# Patient Record
Sex: MALE | Race: WHITE | Employment: FULL TIME | URBAN - METROPOLITAN AREA
[De-identification: names, ages, dates, MRNs, and addresses within clinical notes are randomized per-mention and may not be internally consistent; named-entity substitution may affect disease eponyms.]

---

## 2018-11-05 ENCOUNTER — HOSPITAL ENCOUNTER (OUTPATIENT)
Dept: ULTRASOUND IMAGING | Age: 53
Discharge: HOME | End: 2018-11-07
Payer: COMMERCIAL

## 2018-11-05 DIAGNOSIS — Z82.49 FAMILY HISTORY OF ISCHEMIC HEART DISEASE: ICD-10-CM

## 2018-11-05 PROCEDURE — 93978 VASCULAR STUDY: CPT

## 2019-01-11 ENCOUNTER — ANESTHESIA EVENT (OUTPATIENT)
Dept: ENDOSCOPY | Age: 54
End: 2019-01-11
Payer: COMMERCIAL

## 2019-01-14 ENCOUNTER — ANESTHESIA (OUTPATIENT)
Dept: ENDOSCOPY | Age: 54
End: 2019-01-14
Payer: COMMERCIAL

## 2019-01-14 ENCOUNTER — HOSPITAL ENCOUNTER (OUTPATIENT)
Age: 54
Setting detail: OUTPATIENT SURGERY
Discharge: HOME | End: 2019-01-14
Attending: SPECIALIST | Admitting: SPECIALIST
Payer: COMMERCIAL

## 2019-01-14 VITALS
SYSTOLIC BLOOD PRESSURE: 114 MMHG | HEIGHT: 72 IN | TEMPERATURE: 97.5 F | BODY MASS INDEX: 24.38 KG/M2 | OXYGEN SATURATION: 95 % | DIASTOLIC BLOOD PRESSURE: 73 MMHG | RESPIRATION RATE: 16 BRPM | WEIGHT: 180 LBS | HEART RATE: 65 BPM

## 2019-01-14 VITALS
RESPIRATION RATE: 14 BRPM | OXYGEN SATURATION: 97 % | DIASTOLIC BLOOD PRESSURE: 59 MMHG | SYSTOLIC BLOOD PRESSURE: 89 MMHG

## 2019-01-14 PROCEDURE — 3700000000 HC ANESTHESIA ATTENDED CARE: Performed by: SPECIALIST

## 2019-01-14 PROCEDURE — 7100000010 HC PHASE II RECOVERY - FIRST 15 MIN: Performed by: SPECIALIST

## 2019-01-14 PROCEDURE — 6360000002 HC RX W HCPCS: Performed by: NURSE ANESTHETIST, CERTIFIED REGISTERED

## 2019-01-14 PROCEDURE — 7100000011 HC PHASE II RECOVERY - ADDTL 15 MIN: Performed by: SPECIALIST

## 2019-01-14 PROCEDURE — 2500000003 HC RX 250 WO HCPCS: Performed by: NURSE ANESTHETIST, CERTIFIED REGISTERED

## 2019-01-14 PROCEDURE — 3609027000 HC COLONOSCOPY: Performed by: SPECIALIST

## 2019-01-14 PROCEDURE — 88305 TISSUE EXAM BY PATHOLOGIST: CPT

## 2019-01-14 PROCEDURE — 2580000003 HC RX 258: Performed by: SPECIALIST

## 2019-01-14 PROCEDURE — 3700000001 HC ADD 15 MINUTES (ANESTHESIA): Performed by: SPECIALIST

## 2019-01-14 RX ORDER — ONDANSETRON 2 MG/ML
4 INJECTION INTRAMUSCULAR; INTRAVENOUS
Status: DISCONTINUED | OUTPATIENT
Start: 2019-01-14 | End: 2019-01-14 | Stop reason: HOSPADM

## 2019-01-14 RX ORDER — LIDOCAINE HYDROCHLORIDE 10 MG/ML
1 INJECTION, SOLUTION EPIDURAL; INFILTRATION; INTRACAUDAL; PERINEURAL
Status: DISCONTINUED | OUTPATIENT
Start: 2019-01-14 | End: 2019-01-14 | Stop reason: HOSPADM

## 2019-01-14 RX ORDER — SODIUM CHLORIDE 0.9 % (FLUSH) 0.9 %
10 SYRINGE (ML) INJECTION PRN
Status: DISCONTINUED | OUTPATIENT
Start: 2019-01-14 | End: 2019-01-14 | Stop reason: HOSPADM

## 2019-01-14 RX ORDER — SODIUM CHLORIDE 9 MG/ML
INJECTION, SOLUTION INTRAVENOUS CONTINUOUS
Status: DISCONTINUED | OUTPATIENT
Start: 2019-01-14 | End: 2019-01-14 | Stop reason: HOSPADM

## 2019-01-14 RX ORDER — PROPOFOL 10 MG/ML
INJECTION, EMULSION INTRAVENOUS PRN
Status: DISCONTINUED | OUTPATIENT
Start: 2019-01-14 | End: 2019-01-14 | Stop reason: SDUPTHER

## 2019-01-14 RX ORDER — SODIUM CHLORIDE 0.9 % (FLUSH) 0.9 %
10 SYRINGE (ML) INJECTION EVERY 12 HOURS SCHEDULED
Status: DISCONTINUED | OUTPATIENT
Start: 2019-01-14 | End: 2019-01-14 | Stop reason: HOSPADM

## 2019-01-14 RX ORDER — GLYCOPYRROLATE 1 MG/5 ML
SYRINGE (ML) INTRAVENOUS PRN
Status: DISCONTINUED | OUTPATIENT
Start: 2019-01-14 | End: 2019-01-14 | Stop reason: SDUPTHER

## 2019-01-14 RX ADMIN — PROPOFOL 50 MG: 10 INJECTION, EMULSION INTRAVENOUS at 08:14

## 2019-01-14 RX ADMIN — SODIUM CHLORIDE: 9 INJECTION, SOLUTION INTRAVENOUS at 07:41

## 2019-01-14 RX ADMIN — PROPOFOL 40 MG: 10 INJECTION, EMULSION INTRAVENOUS at 08:04

## 2019-01-14 RX ADMIN — PROPOFOL 30 MG: 10 INJECTION, EMULSION INTRAVENOUS at 08:19

## 2019-01-14 RX ADMIN — PROPOFOL 30 MG: 10 INJECTION, EMULSION INTRAVENOUS at 08:08

## 2019-01-14 RX ADMIN — PROPOFOL 30 MG: 10 INJECTION, EMULSION INTRAVENOUS at 08:28

## 2019-01-14 RX ADMIN — PROPOFOL 80 MG: 10 INJECTION, EMULSION INTRAVENOUS at 08:03

## 2019-01-14 RX ADMIN — PROPOFOL 30 MG: 10 INJECTION, EMULSION INTRAVENOUS at 08:23

## 2019-01-14 RX ADMIN — PROPOFOL 20 MG: 10 INJECTION, EMULSION INTRAVENOUS at 08:10

## 2019-01-14 RX ADMIN — PROPOFOL 30 MG: 10 INJECTION, EMULSION INTRAVENOUS at 08:33

## 2019-01-14 RX ADMIN — PROPOFOL 30 MG: 10 INJECTION, EMULSION INTRAVENOUS at 08:05

## 2019-01-14 RX ADMIN — Medication 0.2 MG: at 08:19

## 2025-05-19 ENCOUNTER — OFFICE VISIT (OUTPATIENT)
Dept: ORTHOPEDIC SURGERY | Facility: CLINIC | Age: 60
End: 2025-05-19
Payer: COMMERCIAL

## 2025-05-19 ENCOUNTER — HOSPITAL ENCOUNTER (OUTPATIENT)
Dept: RADIOLOGY | Facility: CLINIC | Age: 60
Discharge: HOME | End: 2025-05-19
Payer: COMMERCIAL

## 2025-05-19 DIAGNOSIS — M79.645 THUMB PAIN, LEFT: ICD-10-CM

## 2025-05-19 DIAGNOSIS — M19.049 CMC ARTHRITIS: Primary | ICD-10-CM

## 2025-05-19 PROBLEM — Z01.818 PRE-OP EXAM: Status: ACTIVE | Noted: 2025-05-28

## 2025-05-19 PROBLEM — M18.12 UNILATERAL PRIMARY OSTEOARTHRITIS OF FIRST CARPOMETACARPAL JOINT, LEFT HAND: Status: ACTIVE | Noted: 2025-05-28

## 2025-05-19 PROCEDURE — 73140 X-RAY EXAM OF FINGER(S): CPT | Mod: LT

## 2025-05-19 PROCEDURE — 99215 OFFICE O/P EST HI 40 MIN: CPT | Performed by: ORTHOPAEDIC SURGERY

## 2025-05-19 PROCEDURE — 1036F TOBACCO NON-USER: CPT | Performed by: ORTHOPAEDIC SURGERY

## 2025-05-19 PROCEDURE — 99214 OFFICE O/P EST MOD 30 MIN: CPT | Performed by: ORTHOPAEDIC SURGERY

## 2025-05-19 PROCEDURE — 73140 X-RAY EXAM OF FINGER(S): CPT | Mod: LEFT SIDE | Performed by: RADIOLOGY

## 2025-05-19 RX ORDER — LOSARTAN POTASSIUM 25 MG/1
37.5 TABLET ORAL DAILY
COMMUNITY
Start: 2025-01-24

## 2025-05-19 RX ORDER — PROMETHAZINE HYDROCHLORIDE AND DEXTROMETHORPHAN HYDROBROMIDE 6.25; 15 MG/5ML; MG/5ML
SYRUP ORAL
COMMUNITY
Start: 2024-01-06

## 2025-05-19 RX ORDER — ATORVASTATIN CALCIUM 10 MG/1
10 TABLET, FILM COATED ORAL DAILY
COMMUNITY
Start: 2025-05-09

## 2025-05-19 NOTE — PROGRESS NOTES
History present illness: Patient presents today for worsening left thumb CMC arthritis pain.  Previously scheduled for surgery.  He never did follow through.  He presents today to get back on track.  He has had trials of steroid injection and those afforded little to no relief.  History of aortic aneurysm apparently stable.  Follows with cardiologist through [facility] for this.      Past medical history: The patient's past medical history, family history, social history, and review of systems were documented on the patient medical intake.  The updated data was reviewed in the electronic medical record.  History is negative except otherwise stated in history of present illness.        Physical examination:  General: Alert and oriented to person, place, and time.  No acute distress and breathing comfortably: Pleasant and cooperative with examination.  HEENT: Head is normocephalic and atraumatic.  Neck: Supple, no visible swelling.  Cardiovascular: No palpable tachycardia  Lungs: No audible wheezing or labored breathing  Abdomen: Nondistended.  Extremities: Evaluation of the left upper extremity finds the patient had palpable radial artery at the wrist with brisk capillary refill to all digits.  Patient has intact sensation to axillary radial median and ulnar nerves.  There are no open wounds.  There are no signs of infection.  There is no evidence of lymphedema or lymphatic streaking.  The patient has supple compartments to left arm forearm and hand.  Focal tenderness to left thumb at CMC articulation.      Radiology: Severe left thumb CMC arthritis      Assessment: Severe left thumb CMC arthritis      Plan: Treatment options were discussed.  We talked about operative and nonoperative strategies.  We talked about intraoperative techniques and postoperative protocols.  Patient elects proceed forth with left thumb CMC arthroplasty.  Plan for tight rope on hold but not necessarily use it.  Clearance through [facility]  cardiology.  He takes no blood thinners.  Surgery to be done at the hospital.        Procedure:

## 2025-05-19 NOTE — H&P (VIEW-ONLY)
History present illness: Patient presents today for worsening left thumb CMC arthritis pain.  Previously scheduled for surgery.  He never did follow through.  He presents today to get back on track.  He has had trials of steroid injection and those afforded little to no relief.  History of aortic aneurysm apparently stable.  Follows with cardiologist through [facility] for this.      Past medical history: The patient's past medical history, family history, social history, and review of systems were documented on the patient medical intake.  The updated data was reviewed in the electronic medical record.  History is negative except otherwise stated in history of present illness.        Physical examination:  General: Alert and oriented to person, place, and time.  No acute distress and breathing comfortably: Pleasant and cooperative with examination.  HEENT: Head is normocephalic and atraumatic.  Neck: Supple, no visible swelling.  Cardiovascular: No palpable tachycardia  Lungs: No audible wheezing or labored breathing  Abdomen: Nondistended.  Extremities: Evaluation of the left upper extremity finds the patient had palpable radial artery at the wrist with brisk capillary refill to all digits.  Patient has intact sensation to axillary radial median and ulnar nerves.  There are no open wounds.  There are no signs of infection.  There is no evidence of lymphedema or lymphatic streaking.  The patient has supple compartments to left arm forearm and hand.  Focal tenderness to left thumb at CMC articulation.      Radiology: Severe left thumb CMC arthritis      Assessment: Severe left thumb CMC arthritis      Plan: Treatment options were discussed.  We talked about operative and nonoperative strategies.  We talked about intraoperative techniques and postoperative protocols.  Patient elects proceed forth with left thumb CMC arthroplasty.  Plan for tight rope on hold but not necessarily use it.  Clearance through [facility]  cardiology.  He takes no blood thinners.  Surgery to be done at the hospital.        Procedure:

## 2025-05-27 NOTE — DISCHARGE INSTRUCTIONS
Medication given may have significant effects after discharge. Therefore on the day of surgery:  1) You must be accompanied by a responsible adult upon discharge and for 24 hours after surgery.  Do not drive a motor vehicle, operate machinery, power tools or appliance, drink alcoholic beverages, or make critical decisions for 24 hours.  2) Be aware of dizziness, which may cause a fall. Change positions slowly.  3) Eating: you may resume your regular diet but it is better to increase intake slowly with mild foods and working up to your regular diet. No greasy, fried or spicy foods today.  4) Nausea/Vomiting: Nausea and vomiting may occur as you become more active or begin to increase food intake. If this should happen, decrease activity and return to liquids. If the problem persists, call your surgeon  5) Pain: Your surgeon may have given you a prescription for pain medication. Take pain medication with food as prescribed. Pain medication may cause constipation, so drink plenty of fluids. If your pain medication does not provide adequate relief, call your surgeon  6) Urinating: Notify your surgeon if you have not urinated within 12 hours after discharge  7) Ice: Apply ice to operative site for 20 min 5-6 times a day or use Polar care as instructed  8) Dressing:     [x]  Leave dressing in place. Keep dressing/ incision clean and dry.      9) Activity:    Shoulder/ Elbow/Hand:   [x]  Elevate extremity    [x]  Sling   [] At all times (except for exercises and showering)  [x] As needed only for comfort   [x] Begin daily motion exercises out of sling as instructed   [x]  Bend and flex fingers/wrist/elbow frequently   [x] Non-weight bearing/No lifting/gripping/squeezing to the surgical limb   [] No lifting greater than 1 lb until follow-up visit      10) Begin physical therapy if advised by your physician:   [] Before returning to see you doctor    [] Will discuss possible need at follow-up visit   [x] Will be paired  with your follow-up visit in [place]    11) Call your doctor at [phone] for an appointment (or follow up as scheduled)    Contact [facility] office if  Increased redness, swelling, drainage of any kind, and/or pain to surgery site.  As well as new onset fevers and or chills.  These could signify an infection.  Calf or thigh tenderness to touch as well as increased swelling or redness.  This could signify a clot formation.  Numbness or tingling to an area around the incision site or below the incision site (toes). Or if the operative extremity becomes cold, blue.  Any rash appears, increased  or new onset nausea/vomiting occur.  This may indicate a reaction to a medication.  Temp is 38.5 C (101F)  12) If you have any concerns or questions, please call [facility] surgeon on call. The 24- hour phone is [phone]  13) If you are unable to contact your surgeon, in an emergency situation, go to the nearest hospital

## 2025-05-28 ENCOUNTER — ANESTHESIA (OUTPATIENT)
Dept: OPERATING ROOM | Facility: HOSPITAL | Age: 60
End: 2025-05-28
Payer: COMMERCIAL

## 2025-05-28 ENCOUNTER — ANESTHESIA EVENT (OUTPATIENT)
Dept: OPERATING ROOM | Facility: HOSPITAL | Age: 60
End: 2025-05-28
Payer: COMMERCIAL

## 2025-05-28 ENCOUNTER — PHARMACY VISIT (OUTPATIENT)
Dept: PHARMACY | Facility: CLINIC | Age: 60
End: 2025-05-28
Payer: COMMERCIAL

## 2025-05-28 ENCOUNTER — APPOINTMENT (OUTPATIENT)
Dept: RADIOLOGY | Facility: HOSPITAL | Age: 60
End: 2025-05-28
Payer: COMMERCIAL

## 2025-05-28 ENCOUNTER — HOSPITAL ENCOUNTER (OUTPATIENT)
Facility: HOSPITAL | Age: 60
Setting detail: OUTPATIENT SURGERY
Discharge: HOME | End: 2025-05-28
Attending: ORTHOPAEDIC SURGERY | Admitting: ORTHOPAEDIC SURGERY
Payer: COMMERCIAL

## 2025-05-28 VITALS
WEIGHT: 180 LBS | OXYGEN SATURATION: 99 % | HEIGHT: 72 IN | BODY MASS INDEX: 24.38 KG/M2 | HEART RATE: 52 BPM | RESPIRATION RATE: 16 BRPM | DIASTOLIC BLOOD PRESSURE: 76 MMHG | TEMPERATURE: 96.3 F | SYSTOLIC BLOOD PRESSURE: 121 MMHG

## 2025-05-28 DIAGNOSIS — Z01.818 PRE-OP EXAM: ICD-10-CM

## 2025-05-28 DIAGNOSIS — G89.18 POST-OP PAIN: Primary | ICD-10-CM

## 2025-05-28 DIAGNOSIS — M18.12 UNILATERAL PRIMARY OSTEOARTHRITIS OF FIRST CARPOMETACARPAL JOINT, LEFT HAND: ICD-10-CM

## 2025-05-28 PROCEDURE — 64415 NJX AA&/STRD BRCH PLXS IMG: CPT | Performed by: ANESTHESIOLOGY

## 2025-05-28 PROCEDURE — 3600000004 HC OR TIME - INITIAL BASE CHARGE - PROCEDURE LEVEL FOUR: Performed by: ORTHOPAEDIC SURGERY

## 2025-05-28 PROCEDURE — 3600000009 HC OR TIME - EACH INCREMENTAL 1 MINUTE - PROCEDURE LEVEL FOUR: Performed by: ORTHOPAEDIC SURGERY

## 2025-05-28 PROCEDURE — 64417 NJX AA&/STRD AX NERVE IMG: CPT | Performed by: ANESTHESIOLOGY

## 2025-05-28 PROCEDURE — 7100000010 HC PHASE TWO TIME - EACH INCREMENTAL 1 MINUTE: Performed by: ORTHOPAEDIC SURGERY

## 2025-05-28 PROCEDURE — 2500000004 HC RX 250 GENERAL PHARMACY W/ HCPCS (ALT 636 FOR OP/ED): Performed by: PHYSICIAN ASSISTANT

## 2025-05-28 PROCEDURE — RXMED WILLOW AMBULATORY MEDICATION CHARGE

## 2025-05-28 PROCEDURE — 7100000009 HC PHASE TWO TIME - INITIAL BASE CHARGE: Performed by: ORTHOPAEDIC SURGERY

## 2025-05-28 PROCEDURE — 7100000001 HC RECOVERY ROOM TIME - INITIAL BASE CHARGE: Performed by: ORTHOPAEDIC SURGERY

## 2025-05-28 PROCEDURE — 3700000002 HC GENERAL ANESTHESIA TIME - EACH INCREMENTAL 1 MINUTE: Performed by: ORTHOPAEDIC SURGERY

## 2025-05-28 PROCEDURE — 2500000001 HC RX 250 WO HCPCS SELF ADMINISTERED DRUGS (ALT 637 FOR MEDICARE OP): Performed by: ANESTHESIOLOGY

## 2025-05-28 PROCEDURE — A25447 PR REPAIR INTERCARP/CARP-METACARP JT: Performed by: NURSE ANESTHETIST, CERTIFIED REGISTERED

## 2025-05-28 PROCEDURE — 2500000004 HC RX 250 GENERAL PHARMACY W/ HCPCS (ALT 636 FOR OP/ED): Performed by: NURSE ANESTHETIST, CERTIFIED REGISTERED

## 2025-05-28 PROCEDURE — 2500000005 HC RX 250 GENERAL PHARMACY W/O HCPCS: Performed by: ANESTHESIOLOGY

## 2025-05-28 PROCEDURE — 3700000001 HC GENERAL ANESTHESIA TIME - INITIAL BASE CHARGE: Performed by: ORTHOPAEDIC SURGERY

## 2025-05-28 PROCEDURE — 2500000004 HC RX 250 GENERAL PHARMACY W/ HCPCS (ALT 636 FOR OP/ED): Mod: JW | Performed by: ANESTHESIOLOGY

## 2025-05-28 PROCEDURE — 2720000007 HC OR 272 NO HCPCS: Performed by: ORTHOPAEDIC SURGERY

## 2025-05-28 PROCEDURE — A25447 PR REPAIR INTERCARP/CARP-METACARP JT: Performed by: ANESTHESIOLOGY

## 2025-05-28 PROCEDURE — 7100000002 HC RECOVERY ROOM TIME - EACH INCREMENTAL 1 MINUTE: Performed by: ORTHOPAEDIC SURGERY

## 2025-05-28 RX ORDER — DEXAMETHASONE SODIUM PHOSPHATE 10 MG/ML
INJECTION INTRAMUSCULAR; INTRAVENOUS AS NEEDED
Status: DISCONTINUED | OUTPATIENT
Start: 2025-05-28 | End: 2025-05-28

## 2025-05-28 RX ORDER — MIDAZOLAM HYDROCHLORIDE 1 MG/ML
INJECTION, SOLUTION INTRAMUSCULAR; INTRAVENOUS AS NEEDED
Status: DISCONTINUED | OUTPATIENT
Start: 2025-05-28 | End: 2025-05-28

## 2025-05-28 RX ORDER — IBUPROFEN 600 MG/1
600 TABLET, FILM COATED ORAL EVERY 6 HOURS PRN
Status: DISCONTINUED | OUTPATIENT
Start: 2025-05-28 | End: 2025-05-28 | Stop reason: HOSPADM

## 2025-05-28 RX ORDER — METOPROLOL SUCCINATE 25 MG/1
12.5 TABLET, EXTENDED RELEASE ORAL
COMMUNITY
Start: 2025-01-24

## 2025-05-28 RX ORDER — LIDOCAINE HYDROCHLORIDE 20 MG/ML
INJECTION, SOLUTION INFILTRATION; PERINEURAL AS NEEDED
Status: DISCONTINUED | OUTPATIENT
Start: 2025-05-28 | End: 2025-05-28

## 2025-05-28 RX ORDER — PHENAZOPYRIDINE HYDROCHLORIDE 100 MG/1
200 TABLET, FILM COATED ORAL ONCE AS NEEDED
Status: DISCONTINUED | OUTPATIENT
Start: 2025-05-28 | End: 2025-05-28 | Stop reason: HOSPADM

## 2025-05-28 RX ORDER — ACETAMINOPHEN 325 MG/1
975 TABLET ORAL ONCE
Status: DISCONTINUED | OUTPATIENT
Start: 2025-05-28 | End: 2025-05-28 | Stop reason: HOSPADM

## 2025-05-28 RX ORDER — ALBUTEROL SULFATE 0.83 MG/ML
2.5 SOLUTION RESPIRATORY (INHALATION) ONCE AS NEEDED
Status: DISCONTINUED | OUTPATIENT
Start: 2025-05-28 | End: 2025-05-28 | Stop reason: HOSPADM

## 2025-05-28 RX ORDER — HYDROMORPHONE HYDROCHLORIDE 1 MG/ML
INJECTION, SOLUTION INTRAMUSCULAR; INTRAVENOUS; SUBCUTANEOUS AS NEEDED
Status: DISCONTINUED | OUTPATIENT
Start: 2025-05-28 | End: 2025-05-28

## 2025-05-28 RX ORDER — BUPIVACAINE HCL/EPINEPHRINE 0.5-1:200K
VIAL (ML) INJECTION
Status: COMPLETED | OUTPATIENT
Start: 2025-05-28 | End: 2025-05-28

## 2025-05-28 RX ORDER — HYDROMORPHONE HYDROCHLORIDE 1 MG/ML
1 INJECTION, SOLUTION INTRAMUSCULAR; INTRAVENOUS; SUBCUTANEOUS EVERY 5 MIN PRN
Status: DISCONTINUED | OUTPATIENT
Start: 2025-05-28 | End: 2025-05-28 | Stop reason: HOSPADM

## 2025-05-28 RX ORDER — PHENYLEPHRINE HCL IN 0.9% NACL 1 MG/10 ML
SYRINGE (ML) INTRAVENOUS AS NEEDED
Status: DISCONTINUED | OUTPATIENT
Start: 2025-05-28 | End: 2025-05-28

## 2025-05-28 RX ORDER — HYDROMORPHONE HYDROCHLORIDE 0.2 MG/ML
0.1 INJECTION INTRAMUSCULAR; INTRAVENOUS; SUBCUTANEOUS EVERY 5 MIN PRN
Status: DISCONTINUED | OUTPATIENT
Start: 2025-05-28 | End: 2025-05-28 | Stop reason: HOSPADM

## 2025-05-28 RX ORDER — PROPOFOL 10 MG/ML
INJECTION, EMULSION INTRAVENOUS AS NEEDED
Status: DISCONTINUED | OUTPATIENT
Start: 2025-05-28 | End: 2025-05-28

## 2025-05-28 RX ORDER — HYDRALAZINE HYDROCHLORIDE 20 MG/ML
10 INJECTION INTRAMUSCULAR; INTRAVENOUS EVERY 30 MIN PRN
Status: DISCONTINUED | OUTPATIENT
Start: 2025-05-28 | End: 2025-05-28 | Stop reason: HOSPADM

## 2025-05-28 RX ORDER — OXYCODONE AND ACETAMINOPHEN 5; 325 MG/1; MG/1
1 TABLET ORAL EVERY 4 HOURS PRN
Status: DISCONTINUED | OUTPATIENT
Start: 2025-05-28 | End: 2025-05-28 | Stop reason: HOSPADM

## 2025-05-28 RX ORDER — SODIUM CHLORIDE, SODIUM LACTATE, POTASSIUM CHLORIDE, CALCIUM CHLORIDE 600; 310; 30; 20 MG/100ML; MG/100ML; MG/100ML; MG/100ML
INJECTION, SOLUTION INTRAVENOUS CONTINUOUS PRN
Status: DISCONTINUED | OUTPATIENT
Start: 2025-05-28 | End: 2025-05-28

## 2025-05-28 RX ORDER — CEFAZOLIN SODIUM 2 G/100ML
2 INJECTION, SOLUTION INTRAVENOUS ONCE
Status: COMPLETED | OUTPATIENT
Start: 2025-05-28 | End: 2025-05-28

## 2025-05-28 RX ORDER — OXYCODONE AND ACETAMINOPHEN 5; 325 MG/1; MG/1
1 TABLET ORAL EVERY 8 HOURS PRN
Qty: 20 TABLET | Refills: 0 | Status: SHIPPED | OUTPATIENT
Start: 2025-05-28 | End: 2025-06-04

## 2025-05-28 RX ORDER — MIDAZOLAM HYDROCHLORIDE 1 MG/ML
1 INJECTION, SOLUTION INTRAMUSCULAR; INTRAVENOUS ONCE AS NEEDED
Status: DISCONTINUED | OUTPATIENT
Start: 2025-05-28 | End: 2025-05-28 | Stop reason: HOSPADM

## 2025-05-28 RX ORDER — LIDOCAINE HYDROCHLORIDE 20 MG/ML
INJECTION, SOLUTION EPIDURAL; INFILTRATION; INTRACAUDAL; PERINEURAL
Status: COMPLETED | OUTPATIENT
Start: 2025-05-28 | End: 2025-05-28

## 2025-05-28 RX ORDER — PROCHLORPERAZINE EDISYLATE 5 MG/ML
5 INJECTION INTRAMUSCULAR; INTRAVENOUS
Status: DISCONTINUED | OUTPATIENT
Start: 2025-05-28 | End: 2025-05-28 | Stop reason: HOSPADM

## 2025-05-28 RX ORDER — METOPROLOL TARTRATE 1 MG/ML
2.5 INJECTION, SOLUTION INTRAVENOUS EVERY 30 MIN PRN
Status: DISCONTINUED | OUTPATIENT
Start: 2025-05-28 | End: 2025-05-28 | Stop reason: HOSPADM

## 2025-05-28 RX ORDER — OXYCODONE HYDROCHLORIDE 10 MG/1
10 TABLET ORAL EVERY 4 HOURS PRN
Status: DISCONTINUED | OUTPATIENT
Start: 2025-05-28 | End: 2025-05-28 | Stop reason: HOSPADM

## 2025-05-28 RX ORDER — ONDANSETRON HYDROCHLORIDE 2 MG/ML
INJECTION, SOLUTION INTRAVENOUS AS NEEDED
Status: DISCONTINUED | OUTPATIENT
Start: 2025-05-28 | End: 2025-05-28

## 2025-05-28 RX ORDER — FENTANYL CITRATE 50 UG/ML
INJECTION, SOLUTION INTRAMUSCULAR; INTRAVENOUS AS NEEDED
Status: DISCONTINUED | OUTPATIENT
Start: 2025-05-28 | End: 2025-05-28

## 2025-05-28 RX ORDER — SODIUM CHLORIDE, SODIUM LACTATE, POTASSIUM CHLORIDE, CALCIUM CHLORIDE 600; 310; 30; 20 MG/100ML; MG/100ML; MG/100ML; MG/100ML
125 INJECTION, SOLUTION INTRAVENOUS CONTINUOUS
Status: DISCONTINUED | OUTPATIENT
Start: 2025-05-28 | End: 2025-05-28 | Stop reason: HOSPADM

## 2025-05-28 RX ORDER — ONDANSETRON HYDROCHLORIDE 2 MG/ML
4 INJECTION, SOLUTION INTRAVENOUS ONCE AS NEEDED
Status: DISCONTINUED | OUTPATIENT
Start: 2025-05-28 | End: 2025-05-28 | Stop reason: HOSPADM

## 2025-05-28 RX ADMIN — MIDAZOLAM 2 MG: 1 INJECTION INTRAMUSCULAR; INTRAVENOUS at 07:56

## 2025-05-28 RX ADMIN — Medication 50 MCG: at 09:21

## 2025-05-28 RX ADMIN — FENTANYL CITRATE 50 MCG: 50 INJECTION, SOLUTION INTRAMUSCULAR; INTRAVENOUS at 08:58

## 2025-05-28 RX ADMIN — Medication 100 MCG: at 09:28

## 2025-05-28 RX ADMIN — Medication 50 MCG: at 09:24

## 2025-05-28 RX ADMIN — ONDANSETRON 4 MG: 2 INJECTION INTRAMUSCULAR; INTRAVENOUS at 09:23

## 2025-05-28 RX ADMIN — PROPOFOL 50 MG: 10 INJECTION, EMULSION INTRAVENOUS at 09:00

## 2025-05-28 RX ADMIN — Medication 50 MCG: at 09:20

## 2025-05-28 RX ADMIN — SODIUM CHLORIDE, POTASSIUM CHLORIDE, SODIUM LACTATE AND CALCIUM CHLORIDE: 600; 310; 30; 20 INJECTION, SOLUTION INTRAVENOUS at 08:31

## 2025-05-28 RX ADMIN — DEXAMETHASONE SODIUM PHOSPHATE 10 MG: 4 INJECTION INTRA-ARTICULAR; INTRALESIONAL; INTRAMUSCULAR; INTRAVENOUS; SOFT TISSUE at 08:09

## 2025-05-28 RX ADMIN — FENTANYL CITRATE 25 MCG: 50 INJECTION, SOLUTION INTRAMUSCULAR; INTRAVENOUS at 08:44

## 2025-05-28 RX ADMIN — CEFAZOLIN SODIUM 2 G: 2 INJECTION, SOLUTION INTRAVENOUS at 08:45

## 2025-05-28 RX ADMIN — DEXAMETHASONE SODIUM PHOSPHATE 4 MG: 10 INJECTION INTRAMUSCULAR; INTRAVENOUS at 08:37

## 2025-05-28 RX ADMIN — PROPOFOL 50 MG: 10 INJECTION, EMULSION INTRAVENOUS at 08:59

## 2025-05-28 RX ADMIN — PROPOFOL 50 MG: 10 INJECTION, EMULSION INTRAVENOUS at 09:04

## 2025-05-28 RX ADMIN — FENTANYL CITRATE 25 MCG: 50 INJECTION, SOLUTION INTRAMUSCULAR; INTRAVENOUS at 08:37

## 2025-05-28 RX ADMIN — PROPOFOL 160 MG: 10 INJECTION, EMULSION INTRAVENOUS at 08:37

## 2025-05-28 RX ADMIN — LIDOCAINE HYDROCHLORIDE 60 MG: 20 INJECTION, SOLUTION INFILTRATION; PERINEURAL at 08:37

## 2025-05-28 RX ADMIN — LIDOCAINE HYDROCHLORIDE 4 ML: 20 INJECTION, SOLUTION EPIDURAL; INFILTRATION; INTRACAUDAL; PERINEURAL at 08:04

## 2025-05-28 RX ADMIN — BUPIVACAINE HYDROCHLORIDE AND EPINEPHRINE BITARTRATE 30 ML: 5; .005 INJECTION, SOLUTION PERINEURAL at 08:09

## 2025-05-28 RX ADMIN — HYDROMORPHONE HYDROCHLORIDE 0.3 MG: 1 INJECTION, SOLUTION INTRAMUSCULAR; INTRAVENOUS; SUBCUTANEOUS at 09:05

## 2025-05-28 RX ADMIN — PROPOFOL 40 MG: 10 INJECTION, EMULSION INTRAVENOUS at 08:39

## 2025-05-28 RX ADMIN — OXYCODONE HYDROCHLORIDE 10 MG: 10 TABLET ORAL at 11:02

## 2025-05-28 SDOH — HEALTH STABILITY: MENTAL HEALTH: CURRENT SMOKER: 0

## 2025-05-28 ASSESSMENT — PAIN SCALES - GENERAL
PAINLEVEL_OUTOF10: 7
PAINLEVEL_OUTOF10: 4
PAINLEVEL_OUTOF10: 4
PAINLEVEL_OUTOF10: 0 - NO PAIN
PAINLEVEL_OUTOF10: 4
PAIN_LEVEL: 1
PAINLEVEL_OUTOF10: 7
PAINLEVEL_OUTOF10: 0 - NO PAIN

## 2025-05-28 ASSESSMENT — PAIN - FUNCTIONAL ASSESSMENT
PAIN_FUNCTIONAL_ASSESSMENT: 0-10

## 2025-05-28 ASSESSMENT — PAIN DESCRIPTION - DESCRIPTORS
DESCRIPTORS: ACHING;SORE
DESCRIPTORS: ACHING
DESCRIPTORS: ACHING;SORE
DESCRIPTORS: ACHING

## 2025-05-28 ASSESSMENT — PAIN DESCRIPTION - LOCATION: LOCATION: ARM

## 2025-05-28 ASSESSMENT — PAIN DESCRIPTION - ORIENTATION: ORIENTATION: LEFT

## 2025-05-28 NOTE — ANESTHESIA PREPROCEDURE EVALUATION
Patient: Chase Guaman    Procedure Information       Date/Time: 05/28/25 0820    Procedure: LEFT THUMB CARPOMETACARPAL ARTHROPLASTY WITH POSSIBLE TENDON TRANSFER (Left: Thumb)    Location: [facility] OR 03 / [facility]    Surgeons: Ever Alfaro, DO            Relevant Problems   Musculoskeletal   (+) Unilateral primary osteoarthritis of first carpometacarpal joint, left hand       Clinical information reviewed:   Tobacco  Allergies  Meds  Problems  Med Hx  Surg Hx   Fam Hx  Soc   Hx        NPO Detail:  NPO/Void Status  Carbohydrate Drink Given Prior to Surgery? : N  Date of Last Liquid: 05/28/25  Time of Last Liquid: 0500  Date of Last Solid: 05/27/25  Time of Last Solid: 1700  Last Intake Type: Clear fluids  Time of Last Void: 0610         Physical Exam    Airway  Mallampati: III  TM distance: >3 FB  Neck ROM: full  Mouth opening: 3 or more finger widths     Cardiovascular   Rhythm: regular  Rate: abnormal     Dental    Pulmonary - normal examBreath sounds clear to auscultation     Abdominal - normal examAbdomen: soft  Bowel sounds: normal           Anesthesia Plan    History of general anesthesia?: no  History of complications of general anesthesia?: unknown/emergency    ASA 2     general and regional   (GA with Pre-operative Axillary Brachial Plexus block. No Family Hx of severe Adverse Reaction to GA among close first degree blood relatives (family members))  The patient is not a current smoker.  Patient was previously instructed to abstain from smoking on day of procedure.  Patient did not smoke on day of procedure.  Education provided regarding risk of obstructive sleep apnea.  intravenous induction   Postoperative pain plan includes opioids.  Anesthetic plan and risks discussed with patient.  Use of blood products discussed with patient who consented to blood products.    Plan discussed with CRNA.

## 2025-05-28 NOTE — ANESTHESIA PROCEDURE NOTES
Airway  Date/Time: 5/28/2025 8:39 AM  Reason: elective    Airway not difficult    Staffing  Performed: CRNA   Authorized by: Lynda Barnett MD    Performed by: RIVERA Burrell-CRNA, MANUELA  Patient location during procedure: OR    Patient Condition  Indications for airway management: anesthesia  Patient position: sniffing  Planned trial extubation  Sedation level: deep     Final Airway Details   Preoxygenated: yes  Final airway type: supraglottic airway  Successful airway:   Size: 4  Number of attempts at approach: 1  Number of other approaches attempted: 0    Additional Comments  Igel 4 placed easily, secured with silk tape.

## 2025-05-28 NOTE — ANESTHESIA PROCEDURE NOTES
Peripheral Block    Patient location during procedure: pre-op  Medication administered at: 5/28/2025 8:04 AM  End time: 5/28/2025 8:09 AM  Reason for block: at surgeon's request and post-op pain management  Staffing  Performed: attending   Authorized by: Lynda Barnett MD    Performed by: Lynda Barnett MD  Preanesthetic Checklist  Completed: patient identified, IV checked, site marked, risks and benefits discussed, surgical consent, monitors and equipment checked, pre-op evaluation and timeout performed   Timeout performed at: 5/28/2025 7:56 AM  Peripheral Block  Patient position: laying flat  Prep: ChloraPrep  Patient monitoring: heart rate, cardiac monitor and continuous pulse ox  Block type: interscalene and axillary  Laterality: left  Injection technique: single-shot  Local infiltration: lidocaine  Infiltration strength: 2 %  Dose: 4 mL  Needle  Needle type: pencil-tip   Needle gauge: 21 G  Needle length: 5 cm  Needle localization: anatomical landmarks, nerve stimulator and ultrasound guidance  Needle insertion depth: 3 cm  Test dose: negative  Assessment  Injection assessment: negative aspiration for heme, no paresthesia on injection, incremental injection and local visualized surrounding nerve on ultrasound  Paresthesia pain: none  Heart rate change: no  Slow fractionated injection: yes  Additional Notes  30 ml of Bupivacaine (125 mg) with Epinephrine 1:200 000 mixed with 100 mcg of Clonidine and 10 mg of Decadron. Slow, incremental injection of the mix with consistently negative aspirations for heme between bouts of injections. Patient tolerated procedure very well without immediately observable complications.    Medications Administered  bupivacaine-EPINEPHrine (MARCAINE w/EPI) 0.5% -[id number] Perineural - perineural injection   30 mL - 5/28/2025 8:09:00 AM  dexAMETHasone (Decadron) injection - perineural injection   10 mg - 5/28/2025 8:09:00 AM  lidocaine PF (Xylocaine) 20 mg/mL (2 %) injection -  subcutaneous   4 mL - 5/28/2025 8:04:00 AM

## 2025-05-28 NOTE — ANESTHESIA POSTPROCEDURE EVALUATION
Patient: Chase Guaman    Procedure Summary       Date: 05/28/25 Room / Location: [facility] OR 03 / [facility]    Anesthesia Start: 0818 Anesthesia Stop: 0944    Procedure: LEFT THUMB CARPOMETACARPAL ARTHROPLASTY WITH POSSIBLE TENDON TRANSFER (Left: Thumb) Diagnosis:       Unilateral primary osteoarthritis of first carpometacarpal joint, left hand      Pre-op exam      (Unilateral primary osteoarthritis of first carpometacarpal joint, left hand [M18.12])      (Pre-op exam [Z01.818])    Surgeons: Ever Alfaro DO Responsible Provider: Lynda Barnett MD    Anesthesia Type: general, regional ASA Status: 2            Anesthesia Type: general, regional    Vitals Value Taken Time   BP 96/66 05/28/25 10:30   Temp 36.3 °C (97.3 °F) 05/28/25 10:30   Pulse 48 05/28/25 10:32   Resp 12 05/28/25 10:32   SpO2 93 % 05/28/25 10:32   Vitals shown include unfiled device data.    Anesthesia Post Evaluation    Patient location during evaluation: PACU  Patient participation: complete - patient participated  Level of consciousness: sleepy but conscious, responsive to verbal stimuli, responsive to physical stimuli and responsive to light touch  Pain score: 1  Pain management: satisfactory to patient  Multimodal analgesia pain management approach  Airway patency: patent  Two or more strategies used to mitigate risk of obstructive sleep apnea  Cardiovascular status: acceptable and hemodynamically stable  Respiratory status: acceptable, unassisted, spontaneous ventilation, nonlabored ventilation, face mask and nasal cannula  Hydration status: acceptable  Postoperative Nausea and Vomiting: none        There were no known notable events for this encounter.

## 2025-05-28 NOTE — OP NOTE
LEFT THUMB CARPOMETACARPAL ARTHROPLASTY WITH POSSIBLE TENDON TRANSFER (L) Operative Note     Date: 2025  OR Location: [facility] OR    Name: Chase Guaman, : 1965, Age: 59 y.o., MRN: [id number], Sex: male        Preoperative diagnosis: Left thumb CMC osteoarthritis.  Left hand first webspace contracture.      Postoperative diagnosis: Left thumb CMC osteoarthritis.  Left hand first webspace contracture.  Left wrist STT arthritis.    Procedure planned: Left thumb CMC arthroplasty with end to side tendon transfer of abductor pollicis longus to flexor carpi radialis.    Procedure performed: Left thumb CMC arthroplasty with end to side tendon transfer of abductor pollicis longus to flexor carpi radialis.  Partial resection trapezoid for treatment of painful left wrist STT arthritis.    Surgeon: Ever Alfaro D.O.    Assistant: TARA Reno  The physician assistant was present to the entire case. Given the nature of the disease process and the procedure to be performed a skilled surgical assistant was necessary during the case. The assistant was necessary in order to hold retractors and directly assist in the operation. A certified scrub tech was at the back table managing instruments and supplies for the surgical case.    Anesthesia: General    Estimated blood loss: Less than 20 mL    Drains: None    Tourniquet: Approximately 30 minutes at 250 mmHg to the well-padded upper arm    Specimens: None    Implants: None    Indications: The patient presented to the office with painful left thumb CMC arthritis.  He experienced failure of nonoperative treatment strategies.  After full discussion regarding risks benefits and alternatives the patient elected to proceed forth with surgery by way of left thumb CMC arthroplasty with procedures as indicated.  Informed consent was signed and placed in the chart.    Complications: None noted at the time of surgery    Description of operation: The patient was taken to the  operative suite and placed in the supine position on the operating table.  A timeout was performed and the left hand was confirmed to be the operative site.  The patient was carefully positioned on the table in such a fashion as to pad all bony prominences and peripheral nerves.  The patient was administered appropriate IV antibiotics and general anesthesia.  The patient was then prepped and draped in the normal sterile fashion.  After prepping and draping an apex volar chevron shaped incision was marked out over the base of the thumb centered over the CMC articulation.  A 1 cm incision was marked out over the metaphyseal diaphyseal junction of the dorsal second metacarpal slightly ulnar to midline.  A sterile Esmarch was then used to exsanguinate the upper extremity and tourniquet was raised to 250 mmHg. The 15 blade was then used to incise skin over the base of the thumb.  Tenotomy scissors were used to gently dissect down to the subcutaneous plane, taking care to identify and protect the dorsal sensory branches of the radial nerve.  The sensory branches were protected through the remainder of the case.  We then developed the interval between the extensor pollicis brevis and abductor pollicis longus.  With the tendons retracted we created a full-thickness capsular incision down to bone directly overlying the CMC articulation.  Sharp dissection was used to release the soft tissue attachments to the trapezium.  An osteotome was then used to quarter the trapezium and the trapezium fragments were then removed in a piecemeal fashion with the Rongeur.  Care was taken to avoid iatrogenic injury to the underlying FCR tendon and capsular structures.  The articulation between the distal pole scaphoid and trapezoid was then inspected.  There was found to be full-thickness cartilaginous loss and eburnated subchondral bone.  It was deemed most appropriate to resect the proximal portion of the trapezoid to treat this painful  zone of wrist arthritis.  A fine osteotome was used to resect a 4 mm wafer of the proximal edge of the trapezoid.  This was removed with the rongeur.  Range of motion was then undertaken to evaluate for any remaining contact between the remaining portion of the trapezoid and the scaphoid.  Even with axial load imparted and through all planes of motion there was no residual contact.  2-0 Ethibond suture was then used to perform end to side tendon transfer of flexor carpi radialis to abductor pollicis longus.  Care was taken to take bites within the FCR tendon close to its insertion site.  A sling was created by way of a grasping stitch within the abductor pollicis longus suturing it to the FCR tendon.  Not only did this provide a sling that secured axial length of the construct but it also allowed the metacarpal to be positioned in greater abduction and extension, a more functional position.  The tourniquet was deflated.  Meticulous hemostasis was achieved.  Vital stitches were used to close the capsular layer over the CMC articulation.  Interrupted nylon stitches were used to close the skin.  The patient was then placed into a nonadherent dressing and thumb spica short arm plaster splint.  The patient was then allowed to arise from general anesthesia and taken to recovery in stable condition.  Overall the patient tolerated the procedure well.    Disposition: Stable to PACU                Ever Alfaro  Phone Number: [phone]

## 2025-06-09 ENCOUNTER — APPOINTMENT (OUTPATIENT)
Dept: ORTHOPEDIC SURGERY | Facility: CLINIC | Age: 60
End: 2025-06-09
Payer: COMMERCIAL

## 2025-06-12 ENCOUNTER — EVALUATION (OUTPATIENT)
Dept: OCCUPATIONAL THERAPY | Facility: CLINIC | Age: 60
End: 2025-06-12
Payer: COMMERCIAL

## 2025-06-12 ENCOUNTER — OFFICE VISIT (OUTPATIENT)
Dept: ORTHOPEDIC SURGERY | Facility: CLINIC | Age: 60
End: 2025-06-12
Payer: COMMERCIAL

## 2025-06-12 DIAGNOSIS — M18.12 UNILATERAL PRIMARY OSTEOARTHRITIS OF FIRST CARPOMETACARPAL JOINT, LEFT HAND: Primary | ICD-10-CM

## 2025-06-12 DIAGNOSIS — M19.049 CMC ARTHRITIS: Primary | ICD-10-CM

## 2025-06-12 PROCEDURE — 97165 OT EVAL LOW COMPLEX 30 MIN: CPT | Mod: GO | Performed by: OCCUPATIONAL THERAPIST

## 2025-06-12 PROCEDURE — L3808 WHFO, RIGID W/O JOINTS: HCPCS | Performed by: OCCUPATIONAL THERAPIST

## 2025-06-12 PROCEDURE — 99202 OFFICE O/P NEW SF 15 MIN: CPT | Performed by: ORTHOPAEDIC SURGERY

## 2025-06-12 ASSESSMENT — PAIN SCALES - GENERAL: PAINLEVEL_OUTOF10: 0 - NO PAIN

## 2025-06-12 ASSESSMENT — PAIN - FUNCTIONAL ASSESSMENT: PAIN_FUNCTIONAL_ASSESSMENT: 0-10

## 2025-06-12 NOTE — PROGRESS NOTES
6/12/2025    Chief Complaint   Patient presents with    Left Thumb - Post-op     Cmc arthroplasty  DOS: 5/28/25       History of Present Illness:  Patient Chase Guaman , 59 y.o. male, presents today, 6/12/2025, for evaluation of left thumb CMC arthroplasty, 2 weeks post-op.  He kept his postop dressing intact.  No new injury or trauma reported.  Moderate soreness discomfort this improving over time.  He is weaned from his narcotic pain medication.  He has therapy scheduled immediate following today's visit.       Review of Systems:   GENERAL: Negative  GI: Negative  MUSCULOSKELETAL: See HPI  SKIN: Negative  NEURO:  Negative     Physical Exam:  GENERAL:  Alert and oriented to person, place, and time.  No acute distress and breathing comfortably; pleasant and cooperative with the examination.  HEENT:  Head is normocephalic and atraumatic.  NECK:  Supple, no visible swelling.  CARDIOVASCULAR:  No palpable tachycardia.  LUNGS:  No audible wheezing or labored breathing.  ABDOMEN:  Nondistended.  Extremities: Exam     Imaging/Test Results:  None today.     Assessment:  Left thumb CMC arthroplasty, 2 weeks postop.     Plan:  Sutures were removed in the office today.  The patient will have restrictions of nonweightbearing to the left upper extremity.  We discussed and reviewed home exercise program for range of motion recovery, scar massage, and desensitization techniques.  Occupational Therapy referral order was provided for custom splint fabrication and CMC rehab protocols .  The patient will follow up with our office in 4 weeks, repeat x-rays 3 views of the left thumb upon return.  All patient questions answered at today's visit.      Julissa Deluca PA-C

## 2025-06-12 NOTE — PROGRESS NOTES
Occupational Therapy    Occupational Therapy Evaluation    Name: Chase Guaman  MRN: [id number]  : 1965   DATE: 25   Time Calculation  Start Time: 1015  Stop Time: 1055  Time Calculation (min): 40 min     OT Evaluation Time Entry  Evaluation (Low) Time Entry: 20  OT Therapeutic Procedures Time Entry  Therapeutic Exercise Time Entry: 5   WH[employer] fabrication - not a time charge     Insurance Authorization Request: [employer] BMN PT OT COPAY 30 DED (MET)  COVERAGE 80 OOP 6600(0) 71082(0) NO AUTH REQ     Assessment:  Patient is a 59 y.o. male who is 15 days s/p left thumb CMC arthroplasty and partial trapezoid resection surgery.    Patient presented with pain, edema, joint stiffness, capsular tightness, and muscle weakness. He resides home independently with spouse, works as a  (job tasks include fine motor control and heavy lifting). Meaningful leisure activities are working on flowerbeds and cooking. Fabricated patient volar forearm based thumb spica orthosis to protect surgical procedure. Started patient on wrist AAROM and non-involved joints AROM exercises to decrease stiffness and tightness. Patient will benefit from skilled OT for pain/edema management, ROM, and later progress to strengthening to support return to all ADL/ IADL, work, and leisure activities.     Plan:  Outpatient Plan  OT Plan: Modalities, therapeutic exercise, therapeutic activity, manual therapy, neuromotor re-ed  Frequency: 1xw/k  Duration: 6 weeks  Rehab Potential: Good  Plan of Care Agreement: Patient    Subjective   Current Problem:  Left CMC OA    DOI: over 2 years     Surgical Procedure: Left thumb CMC arthroplasty with end to side tendon transfer of abductor pollicis longus to flexor carpi radialis. Partial resection trapezoid for treatment of painful left wrist STT arthritis.   DOS: 2025  Hand Dominance: right   Affected Extremity: left    Mechanism of Injury: Pain in left basal thumb from CMC OA.  "Decided to proceed with CMC arthroplasty surgery.     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 Sutures removed prior to therapy session. No sign of infection.    Palpation: N/A    Range of Motion (in degrees)  Shoulder AROM: WNL     PROM: WNL    Elbow AROM: WNL     PROM: WNL    Wrist AROM: Flex 40, ext 25, sup and pro WFL     PROM: TBT    Digits AROM: lose composite fist   PROM: TBT    Thumb AROM: MCP 0/10, IP 0/35, opposition SF fingertip     PROM: TBT     Sensation: Numbness and tingling in left thumb comes and goes.      Strength: TBT   strength: R  L  Lateral pinch strength: R  L  3 point pinch strength: R  L     Treatment Performed: Volar forearm based thumb spica orthosis - wrist in neutral, thumb in 40 degrees palmar ABD, thumb MCP in slight flexion. Thumb IP joint free. Distal palmar crease stays clear.     Outcome Measures:  Quick Dash Score: at eval 75%    OP EDUCATION:  Education  Individual(s) Educated: Patient  Education Provided: Orthotics and/or prosthetic trainging, Orthotics wearing schedule and precautions, Risk and benefits of OT discussed with patient or other, POC discussed and agreed upon  Home Program: AAROM, AROM  Risk and Benefits Discussed with Patient/Caregiver/Other: yes  Patient/Caregiver Demonstrated Understanding: yes  Plan of Care Discussed and Agreed Upon: yes  Patient " Response to Education: Patient/Caregiver Verbalized Understanding of Information, Patient/Caregiver Performed Return Demonstration of Exercises/Activities, Patient/Caregiver Asked Appropriate Questions     Goals:  By discharge (6 weeks) Chase Rowena  will achieve the following goals:     1. Patient to demonstrate AROM thumb opposition to SF basal crease for dressing and grooming  2. Patient to report pain 0/10 at rest for sleeping  3. Patient to lift and carry 10# with left hand with no difficulty for work tasks  4. Patient to demonstrate  strength left hand to be 70% of dominant side for cooking tasks   5. Patient to demonstrate proper technique and competence with HEP   6. Patient to score disability rate less than 10% on Quick DASH

## 2025-06-26 ENCOUNTER — TREATMENT (OUTPATIENT)
Dept: OCCUPATIONAL THERAPY | Facility: CLINIC | Age: 60
End: 2025-06-26
Payer: COMMERCIAL

## 2025-06-26 DIAGNOSIS — M18.12 UNILATERAL PRIMARY OSTEOARTHRITIS OF FIRST CARPOMETACARPAL JOINT, LEFT HAND: Primary | ICD-10-CM

## 2025-06-26 PROCEDURE — 97530 THERAPEUTIC ACTIVITIES: CPT | Mod: GO | Performed by: OCCUPATIONAL THERAPIST

## 2025-06-26 PROCEDURE — 97110 THERAPEUTIC EXERCISES: CPT | Mod: GO | Performed by: OCCUPATIONAL THERAPIST

## 2025-06-26 ASSESSMENT — PAIN SCALES - GENERAL: PAINLEVEL_OUTOF10: 0 - NO PAIN

## 2025-06-26 ASSESSMENT — PAIN - FUNCTIONAL ASSESSMENT: PAIN_FUNCTIONAL_ASSESSMENT: 0-10

## 2025-06-26 NOTE — PROGRESS NOTES
"Occupational Therapy     Occupational Therapy Treatment  Name: Chase Guaman   MRN: [id number]   : 1965   Date:  2025    Time Calculation  Start Time: 930  Stop Time: 101  Time Calculation (min): 40 min      OT Therapeutic Procedures Time Entry  Therapeutic Activity Time Entry: 15  Therapeutic Exercise Time Entry: 25        Current Problem:   Left CMC OA   Surgical Procedure: Left thumb CMC arthroplasty with end to side tendon transfer of abductor pollicis longus to flexor carpi radialis. Partial resection trapezoid for treatment of painful left wrist STT arthritis.   DOS: 2025    Visit Number: 2    Insurance Authorization Request: [employer] BMN PT OT COPAY 30 DED (MET)  COVERAGE 80 OOP 6600(0) 32308(0) NO AUTH REQ     Assessment:  Patient is progressing appropriately demonstrated by improved AROM of wrist into flex/ext and decreased pain. He remains limited by pain, edema, joint stiffness, capsular tightness, and muscle weakness. Added thumb AROM exercises and fine motor activities to today's session to facilitate motion. Patient will continue to benefit from skilled OT for pain/edema management, ROM, and later progress to strengthening to support full return to all ADL/IADL, work, and leisure activities.       Plan:  Outpatient Plan  OT Plan: Add more fine motor activities  Frequency: 1x/wk  Duration: 6 weeks  Rehab Potential: Good  Plan of Care Agreement: Patient      Subjective   General: \"I don't have too much pain. I am ready to move this thumb and go back to work.\"    Precautions: NWB    Pain Assessment:  Location: left thumb   0/10 at rest, 2/10 at highest  Description: ache      HEP Adherence/Understanding: Good     Objective  UE Assessment  Observation: Mod edema presented in thenar eminence. Sutures removed prior to therapy session. No sign of infection.     Palpation: N/A     Range of Motion (in degrees)  Shoulder AROM: WNL     PROM: WNL     Elbow AROM: WNL     PROM: WNL   "   Wrist AROM: Flex 60 (was 40), ext 30 (was 25), sup and pro WFL     PROM: TBT     Digits AROM: tight composite fist (was lose)   PROM: TBT     Thumb AROM: MCP 0/15 (was 10), IP 0/35, opposition 2 cm to SF fingertip     PROM: TBT      Sensation: Numbness and tingling in left thumb comes and goes.      Strength: TBT   strength: R  L  Lateral pinch strength: R  L  3 point pinch strength: R  L      Treatment Interventions:   Therapeutic Exercise  Therapeutic Exercise Performed: Yes   Therapist completed PROM thumb flexion, wrist flex/ext, thumb opposition   AROM thumb flexion, radial ABD, palmar ABD, opposition with 5 sec hold at end range     Therapeutic Activity  Therapeutic Activity Performed: Yes    9 hole peg trap and place   [employer] dexterity board  Pin pegs board   Beads thread    Issued patient a compression glove large to wear at night    Tolerance of session: No difficulty     Outcome Measures:  Quick Dash Score: at eval 75%     OP EDUCATION:  Education  Individual(s) Educated: Patient  Home Program: AROM, PROM  Risk and Benefits Discussed with Patient/Caregiver/Other: yes  Patient/Caregiver Demonstrated Understanding: yes  Plan of Care Discussed and Agreed Upon: yes  Patient Response to Education: Patient/Caregiver Verbalized Understanding of Information, Patient/Caregiver Performed Return Demonstration of Exercises/Activities, Patient/Caregiver Asked Appropriate Questions    Goals:   By discharge (6 weeks) Chase Guaman  will achieve the following goals:      1. Patient to demonstrate AROM thumb opposition to SF basal crease for dressing and grooming  2. Patient to report pain 0/10 at rest for sleeping  3. Patient to lift and carry 10# with left hand with no difficulty for work tasks  4. Patient to demonstrate  strength left hand to be 70% of dominant side for cooking tasks   5. Patient to demonstrate proper technique and competence with HEP   6. Patient to score disability rate less than 10% on  Quick DASH

## 2025-07-03 ENCOUNTER — TREATMENT (OUTPATIENT)
Dept: OCCUPATIONAL THERAPY | Facility: CLINIC | Age: 60
End: 2025-07-03
Payer: COMMERCIAL

## 2025-07-03 DIAGNOSIS — M18.12 UNILATERAL PRIMARY OSTEOARTHRITIS OF FIRST CARPOMETACARPAL JOINT, LEFT HAND: Primary | ICD-10-CM

## 2025-07-03 PROCEDURE — 97530 THERAPEUTIC ACTIVITIES: CPT | Mod: GO | Performed by: OCCUPATIONAL THERAPIST

## 2025-07-03 PROCEDURE — 97110 THERAPEUTIC EXERCISES: CPT | Mod: GO | Performed by: OCCUPATIONAL THERAPIST

## 2025-07-03 ASSESSMENT — PAIN - FUNCTIONAL ASSESSMENT: PAIN_FUNCTIONAL_ASSESSMENT: 0-10

## 2025-07-03 ASSESSMENT — PAIN SCALES - GENERAL: PAINLEVEL_OUTOF10: 0 - NO PAIN

## 2025-07-03 NOTE — PROGRESS NOTES
"Occupational Therapy     Occupational Therapy Treatment  Name: Chase Guaman   MRN: [id number]   : 1965   Date:  2025    Time Calculation  Start Time: 0850  Stop Time: 930  Time Calculation (min): 40 min      OT Therapeutic Procedures Time Entry  Therapeutic Activity Time Entry: 30  Therapeutic Exercise Time Entry: 10        Current Problem:   Left CMC OA   Surgical Procedure: Left thumb CMC arthroplasty with end to side tendon transfer of abductor pollicis longus to flexor carpi radialis. Partial resection trapezoid for treatment of painful left wrist STT arthritis.   DOS: 2025    Visit Number: 3    Insurance Authorization Request: [employer] BMN PT OT COPAY 30 DED (MET)  COVERAGE 80 OOP 6600(0) 48055(0) NO AUTH REQ     Assessment:  Patient is progressing appropriately demonstrated by improved AROM of thumb into flexion. He remains limited by pain, edema, joint stiffness, capsular tightness, and muscle weakness. Added more fine motor activities to today's session to facilitate motion. Patient will continue to benefit from skilled OT for pain/edema management, ROM, and later progress to strengthening to support full return to all ADL/IADL, work, and leisure activities.       Plan:  Outpatient Plan  OT Plan: Add tan putty exercises  Frequency: 1x/wk  Duration: 6 weeks  Rehab Potential: Good  Plan of Care Agreement: Patient      Subjective   General: \"I try to  food with this hand. I can button my shirt now.\" \"My thumb is sore after exercises.\"    Precautions: NWB    Pain Assessment:  Location: left thumb   0/10 at rest, 7/10 at highest  Description: ache      HEP Adherence/Understanding: Good     Objective  UE Assessment  Observation: Mod edema presented in thenar eminence. Sutures removed prior to therapy session. No sign of infection.     Palpation: N/A     Range of Motion (in degrees)  Shoulder AROM: WNL     PROM: WNL     Elbow AROM: WNL     PROM: WNL     Wrist AROM: Flex 60, ext " 30, sup and pro WFL     PROM: TBT     Digits AROM: tight composite fist   PROM: TBT     Thumb AROM: MCP 0/25 (was 15), IP 0/40 (was 35), opposition 1 cm (was 2 cm) to SF fingertip     PROM: TBT      Sensation: Numbness and tingling in left thumb comes and goes.      Strength: TBT   strength: R  L  Lateral pinch strength: R  L  3 point pinch strength: R  L      Treatment Interventions:   Therapeutic Exercise  Therapeutic Exercise Performed: Yes   Active sign language in conjunction with fluido therapy  Adjusted forearm based orthosis to hand based    Therapeutic Activity  Therapeutic Activity Performed: Yes     P[employer]ue dexterity board trap and place  Tweezers dexterity board  Ball in hand manipulation   Small pegs placing with opposition  Pin pegs trap and place    Tolerance of session: No difficulty     Outcome Measures:  Quick Dash Score: at eval 75%     OP EDUCATION:  Education  Individual(s) Educated: Patient  Home Program: AROM, Fine motor tasks  Risk and Benefits Discussed with Patient/Caregiver/Other: yes  Patient/Caregiver Demonstrated Understanding: yes  Plan of Care Discussed and Agreed Upon: yes  Patient Response to Education: Patient/Caregiver Verbalized Understanding of Information, Patient/Caregiver Performed Return Demonstration of Exercises/Activities, Patient/Caregiver Asked Appropriate Questions    Goals:   By discharge (6 weeks) Chase Guaman  will achieve the following goals:      1. Patient to demonstrate AROM thumb opposition to SF basal crease for dressing and grooming  2. Patient to report pain 0/10 at rest for sleeping  3. Patient to lift and carry 10# with left hand with no difficulty for work tasks  4. Patient to demonstrate  strength left hand to be 70% of dominant side for cooking tasks   5. Patient to demonstrate proper technique and competence with HEP   6. Patient to score disability rate less than 10% on Quick DASH

## 2025-07-10 ENCOUNTER — HOSPITAL ENCOUNTER (OUTPATIENT)
Dept: RADIOLOGY | Facility: CLINIC | Age: 60
Discharge: HOME | End: 2025-07-10
Payer: COMMERCIAL

## 2025-07-10 ENCOUNTER — OFFICE VISIT (OUTPATIENT)
Dept: ORTHOPEDIC SURGERY | Facility: CLINIC | Age: 60
End: 2025-07-10
Payer: COMMERCIAL

## 2025-07-10 ENCOUNTER — TREATMENT (OUTPATIENT)
Dept: OCCUPATIONAL THERAPY | Facility: CLINIC | Age: 60
End: 2025-07-10
Payer: COMMERCIAL

## 2025-07-10 DIAGNOSIS — M19.049 CMC ARTHRITIS: ICD-10-CM

## 2025-07-10 DIAGNOSIS — M18.12 UNILATERAL PRIMARY OSTEOARTHRITIS OF FIRST CARPOMETACARPAL JOINT, LEFT HAND: Primary | ICD-10-CM

## 2025-07-10 PROCEDURE — 73140 X-RAY EXAM OF FINGER(S): CPT | Mod: LT

## 2025-07-10 PROCEDURE — 97530 THERAPEUTIC ACTIVITIES: CPT | Mod: GO | Performed by: OCCUPATIONAL THERAPIST

## 2025-07-10 PROCEDURE — 99202 OFFICE O/P NEW SF 15 MIN: CPT | Performed by: ORTHOPAEDIC SURGERY

## 2025-07-10 PROCEDURE — 73140 X-RAY EXAM OF FINGER(S): CPT | Mod: LEFT SIDE | Performed by: ORTHOPAEDIC SURGERY

## 2025-07-10 ASSESSMENT — PAIN SCALES - GENERAL: PAINLEVEL_OUTOF10: 0 - NO PAIN

## 2025-07-10 ASSESSMENT — PAIN - FUNCTIONAL ASSESSMENT: PAIN_FUNCTIONAL_ASSESSMENT: 0-10

## 2025-07-10 NOTE — LETTER
July 10, 2025     Patient: Chase Guaman   YOB: 1965   Date of Visit: 7/10/2025       To Whom It May Concern:    It is my medical opinion that Chase Guaman may return to work on 25, August 2025 with no restrictions.    If you have any questions or concerns, please don't hesitate to call [phone].         Sincerely,        Ever Alfaro, DO

## 2025-07-10 NOTE — PROGRESS NOTES
"Occupational Therapy     Occupational Therapy Treatment  Name: Chase Guaman   MRN: [id number]   : 1965   Date:  2025    Time Calculation  Start Time: 0850  Stop Time: 929  Time Calculation (min): 39 min      OT Therapeutic Procedures Time Entry  Therapeutic Activity Time Entry: 39      Current Problem:   Left CMC OA   Surgical Procedure: Left thumb CMC arthroplasty with end to side tendon transfer of abductor pollicis longus to flexor carpi radialis. Partial resection trapezoid for treatment of painful left wrist STT arthritis.   DOS: 2025    Visit Number: 3    Insurance Authorization Request: [employer] BMN PT OT COPAY 30 DED (MET)  COVERAGE 80 OOP 6600(0) 83785(0) NO AUTH REQ     Assessment:  Patient is progressing appropriately demonstrated by improved AROM of thumb into opposition. He remains limited by pain, joint stiffness, capsular tightness, and muscle weakness. Added tan putty exercises to today's session to promote extrinsic muscle strength and WB tolerance. Patient will continue to benefit from skilled OT for pain/edema management, ROM, and progressive strengthening to support full return to all ADL/IADL, work, and leisure activities.       Plan:  Outpatient Plan  OT Plan: Progress resistance training  Frequency: 1x/wk  Duration: 6 weeks  Rehab Potential: Good  Plan of Care Agreement: Patient      Subjective   General: \"There was a painful pop in my forearm Thursday night but 3 days later the pain went away and seems like I can bend my thumb better now. \" \"I can pick things up and carry things now.\"     Precautions: WBAT    Pain Assessment:  Location: left thumb   0/10 at rest, 3-4 (was 7)/10 at highest  Description: ache      HEP Adherence/Understanding: Good     Objective  UE Assessment  Observation: Mod edema presented in thenar eminence. Sutures removed prior to therapy session. No sign of infection.     Palpation: N/A     Range of Motion (in degrees)  Shoulder AROM: WNL     " PROM: WNL     Elbow AROM: WNL     PROM: WNL     Wrist AROM: Flex 60, ext 35 (was 30), sup and pro WFL     PROM: TBT     Digits AROM: tight composite fist   PROM: TBT     Thumb AROM: MCP 0/25, IP 0/55 (was 40), opposition to SF fingertip (was 1 cm)     PROM: TBT      Sensation: Numbness and tingling in left thumb comes and goes.      Strength: TBT   strength: R  L  Lateral pinch strength: R  L  3 point pinch strength: R  L      Treatment Interventions:   Therapeutic Exercise  Therapeutic Exercise Performed: Yes   Active sign language in conjunction with fluido therapy  Salazar putty palm press, free weight 1# press  Tan putty rolling   Tan putty gripping with forearm in various positions  Wrist flex/ext with free weight 1#          Tolerance of session: No difficulty     Outcome Measures:  Quick Dash Score: at eval 75%     OP EDUCATION:  Education  Individual(s) Educated: Patient  Home Program: Strengthening  Risk and Benefits Discussed with Patient/Caregiver/Other: yes  Patient/Caregiver Demonstrated Understanding: yes  Plan of Care Discussed and Agreed Upon: yes  Patient Response to Education: Patient/Caregiver Verbalized Understanding of Information, Patient/Caregiver Performed Return Demonstration of Exercises/Activities, Patient/Caregiver Asked Appropriate Questions    Goals:   By discharge (6 weeks) Chase Guaman  will achieve the following goals:      1. Patient to demonstrate AROM thumb opposition to SF basal crease for dressing and grooming  2. Patient to report pain 0/10 at rest for sleeping  3. Patient to lift and carry 10# with left hand with no difficulty for work tasks  4. Patient to demonstrate  strength left hand to be 70% of dominant side for cooking tasks   5. Patient to demonstrate proper technique and competence with HEP   6. Patient to score disability rate less than 10% on Quick DASH

## 2025-07-10 NOTE — PROGRESS NOTES
7/10/2025    Chief Complaint   Patient presents with    Left Thumb - Follow-up     Cmc arthroplasty  DOS: 5/28/25  XRAY TODAY        History of Present Illness:  Patient Chase Guaman , 60 y.o. male, presents today, 7/10/2025, for evaluation of left thumb CMC arthroplasty, 6 weeks postop.  Patient working therapy and motion covered.  He has been compliant with splinting regiment.  He states that last week he had an incidence where he was doing his rehab exercises at home and his splint was off.  He was actually reaching to  his brace and felt a pop in the radial volar wrist.  He said he felt like a rubber band sensation of retraction and then had pain and swelling and bruising through the forearm.  This has been improving over time and he still has continued ecchymosis, however the pain in the forearm has largely resolved.  He is inquiring about returning to work status.         Review of Systems:   GENERAL: Negative  GI: Negative  MUSCULOSKELETAL: See HPI  SKIN: Negative  NEURO:  Negative     Physical Exam:  GENERAL:  Alert and oriented to person, place, and time.  No acute distress and breathing comfortably; pleasant and cooperative with the examination.  HEENT:  Head is normocephalic and atraumatic.  NECK:  Supple, no visible swelling.  CARDIOVASCULAR:  No palpable tachycardia.  LUNGS:  No audible wheezing or labored breathing.  ABDOMEN:  Nondistended.  Extremities: Evaluation of left upper extremity finds the patient to have a palpable radial artery at the wrist with brisk capillary refill to all digits. The patient has intact sensorium to axillary, radial, median and ulnar nerves. There are no open wounds. There are no signs of infection. There is no evidence of lymphedema or lymphatic streaking. The patient has supple compartments of the left arm, forearm and hand.  Surgical incision is well-healed.  He can flex the thumb to nearly level the PIP flexion crease the left small finger.  He has  resolving ecchymosis to the mid forearm and mild tenderness through the zone likely consistent with FCR rupture.  He demonstrates good range of motion through flexion extension of the wrist however.     Imaging/Test Results:  Radiographs of the thumb show no acute fracture dislocation.  Surgical resection of trapezium is noted.  Adequate alignment throughout the 3 planes.  No evidence of metacarpal subsidence.     Assessment:  Left thumb CMC arthroplasty, 6 weeks postop with suspected rupture of FCR in the interim since last visit.     Plan:  Wean from splint immobilization.  Progress 1 pound maximum weightbearing for 4 weeks to the left upper extremity.  After thoughtful discussion we elect for him to remain out of work for an additional 6 weeks before returning without restrictions.  Follow-up with our office again in 6 weeks for repeat clinical and radiographic exam.  New therapy requisition provided.  Recommend they slow him down just slightly to allow for continued healing after FCR tendon rupture.  Patient verbalized agreement, understanding, plan for care.  All questions answered today's visit.      Julissa Deluca PA-C

## 2025-07-17 ENCOUNTER — TREATMENT (OUTPATIENT)
Dept: OCCUPATIONAL THERAPY | Facility: CLINIC | Age: 60
End: 2025-07-17
Payer: COMMERCIAL

## 2025-07-17 DIAGNOSIS — M18.12 UNILATERAL PRIMARY OSTEOARTHRITIS OF FIRST CARPOMETACARPAL JOINT, LEFT HAND: Primary | ICD-10-CM

## 2025-07-17 PROCEDURE — 97110 THERAPEUTIC EXERCISES: CPT | Mod: GO | Performed by: OCCUPATIONAL THERAPIST

## 2025-07-17 PROCEDURE — 97530 THERAPEUTIC ACTIVITIES: CPT | Mod: GO | Performed by: OCCUPATIONAL THERAPIST

## 2025-07-17 ASSESSMENT — PAIN SCALES - GENERAL: PAINLEVEL_OUTOF10: 0 - NO PAIN

## 2025-07-17 ASSESSMENT — PAIN - FUNCTIONAL ASSESSMENT: PAIN_FUNCTIONAL_ASSESSMENT: 0-10

## 2025-07-17 NOTE — PROGRESS NOTES
"Occupational Therapy     Occupational Therapy Treatment  Name: Chase Guaman   MRN: [id number]   : 1965   Date:  2025    Time Calculation  Start Time: 845  Stop Time: 928  Time Calculation (min): 43 min      OT Therapeutic Procedures Time Entry  Therapeutic Activity Time Entry: 23  Therapeutic Exercise Time Entry: 20      Current Problem:   Left CMC OA   Surgical Procedure: Left thumb CMC arthroplasty with end to side tendon transfer of abductor pollicis longus to flexor carpi radialis. Partial resection trapezoid for treatment of painful left wrist STT arthritis.   DOS: 2025    Visit Number: 4    Insurance Authorization Request: [employer] BMN PT OT COPAY 30 DED (MET)  COVERAGE 80 OOP 6600(0) 05886(0) NO AUTH REQ     Assessment:  Patient is progressing appropriately demonstrated by improved AROM of thumb into flexion and opposition. He remains limited by pain, joint stiffness, capsular tightness, and muscle weakness. Added for functional resistance training with tan putty to today's session to promote extrinsic muscle strength and WB tolerance. Patient will continue to benefit from skilled OT for pain/edema management, ROM, and progressive strengthening to support full return to all ADL/IADL, work, and leisure activities.       Plan:  Outpatient Plan  OT Plan: Progress resistance training  Frequency: 1X/WK  Duration: 6 weeks  Rehab Potential: Good  Plan of Care Agreement: Patient      Subjective   General: \"I can turn the door knob. I even used a power wash the other day.\"    Precautions: WBAT    Pain Assessment:  Location: left thumb   0/10 at rest, 2 (was 3-4)/10 at highest  Description: soreness      HEP Adherence/Understanding: Good     Objective  UE Assessment  Observation: Mod edema presented in thenar eminence. No sign of infection.     Palpation: N/A     Range of Motion (in degrees)  Shoulder AROM: WNL     PROM: WNL     Elbow AROM: WNL     PROM: WNL     Wrist AROM: Flex 60, ext 35 " (was 30), sup and pro WFL     PROM: TBT     Digits AROM: tight composite fist   PROM: TBT     Thumb AROM: MCP 0/30 (was 25), IP 0/55, opposition to SF 2nd crease (was to fingertip)     PROM: TBT      Sensation: Numbness and tingling in left thumb comes and goes.      Strength:    strength: R 100#, L 70#  Lateral pinch strength: R 20#,  L 9#  3 point pinch strength: R 18#,  L 5#      Treatment Interventions:   Therapeutic Exercise  Therapeutic Exercise Performed: Yes   Active sign language in conjunction with fluido therapy  Salazar putty lateral pinch and pull apart    Therapeutic Activity  Therapeutic Activity Performed: Yes     Small peg thread through tan putty  Scavenger hunt tan putty    L bar tan putty press    Tolerance of session: No difficulty     Outcome Measures:  Quick Dash Score: at eval 75%     OP EDUCATION:  Education  Individual(s) Educated: Patient  Home Program: Strengthening  Risk and Benefits Discussed with Patient/Caregiver/Other: yes  Patient/Caregiver Demonstrated Understanding: yes  Plan of Care Discussed and Agreed Upon: yes  Patient Response to Education: Patient/Caregiver Verbalized Understanding of Information, Patient/Caregiver Performed Return Demonstration of Exercises/Activities, Patient/Caregiver Asked Appropriate Questions    Goals:   By discharge (6 weeks) Chase Guaman  will achieve the following goals:      1. Patient to demonstrate AROM thumb opposition to SF basal crease for dressing and grooming  2. Patient to report pain 0/10 at rest for sleeping  3. Patient to lift and carry 10# with left hand with no difficulty for work tasks  4. Patient to demonstrate  strength left hand to be 70% of dominant side for cooking tasks   5. Patient to demonstrate proper technique and competence with HEP   6. Patient to score disability rate less than 10% on Quick DASH

## 2025-07-28 ENCOUNTER — APPOINTMENT (OUTPATIENT)
Dept: OCCUPATIONAL THERAPY | Facility: CLINIC | Age: 60
End: 2025-07-28
Payer: COMMERCIAL

## 2025-07-28 DIAGNOSIS — M18.12 UNILATERAL PRIMARY OSTEOARTHRITIS OF FIRST CARPOMETACARPAL JOINT, LEFT HAND: Primary | ICD-10-CM

## 2025-07-28 PROCEDURE — 97110 THERAPEUTIC EXERCISES: CPT | Mod: GO | Performed by: OCCUPATIONAL THERAPIST

## 2025-07-28 ASSESSMENT — PAIN SCALES - GENERAL: PAINLEVEL_OUTOF10: 0 - NO PAIN

## 2025-07-28 ASSESSMENT — PAIN - FUNCTIONAL ASSESSMENT: PAIN_FUNCTIONAL_ASSESSMENT: 0-10

## 2025-07-28 NOTE — PROGRESS NOTES
"Occupational Therapy     Occupational Therapy Treatment  Name: Chase Guaman   MRN: [id number]   : 1965   Date:  2025    Time Calculation  Start Time: 845  Stop Time: 930  Time Calculation (min): 45 min      OT Therapeutic Procedures Time Entry  Therapeutic Exercise Time Entry: 45      Current Problem:   Left CMC OA   Surgical Procedure: Left thumb CMC arthroplasty with end to side tendon transfer of abductor pollicis longus to flexor carpi radialis. Partial resection trapezoid for treatment of painful left wrist STT arthritis.   DOS: 2025    Visit Number: 5    Insurance Authorization Request: [employer] PT OT COPAY 30 DED (MET)  COVERAGE 80 OOP 6600(0) 20631(0) NO AUTH REQ     Assessment:  Patient is progressing appropriately demonstrated by improved AROM of thumb into opposition and increased /pinch strength. He remains limited by muscle weakness so progressed resistance training to yellow putty and 1# free weight today to promote extrinsic muscle strength. Patient will continue to benefit from skilled OT for progressive strengthening to support full return to all ADL/IADL, work, and leisure activities.       Plan:  Outpatient Plan  OT Plan: Progress resistance training  Frequency: 1x/wk  Duration: 6 weeks  Rehab Potential: Good  Plan of Care Agreement: Patient      Subjective   General: \"I can  grocery bags and carry them. I can do seatbelts.\"    Precautions: WBAT    Pain Assessment:  Location: left thumb   0/10 at rest, 2 (was 3-4)/10 at highest  Description: soreness      HEP Adherence/Understanding: Good     Objective  UE Assessment  Observation: Mod edema presented in thenar eminence. No sign of infection.     Palpation: N/A     Range of Motion (in degrees)  Shoulder AROM: WNL     PROM: WNL     Elbow AROM: WNL     PROM: WNL     Wrist AROM: Flex 60, ext 40 (was 35), sup and pro WFL     PROM: TBT     Digits AROM: tight composite fist   PROM: TBT     Thumb AROM: MCP 0/30 " (was 25), IP 0/55, opposition to SF basal crease (was 2nd crease)     PROM: TBT      Sensation: Numbness and tingling in left thumb comes and goes.      Strength:    strength: R 100#, L 75# (was 70#)  Lateral pinch strength: R 20#,  L 11# (was 9#)  3 point pinch strength: R 18#,  L 9# (was 5#)      Treatment Interventions:   Therapeutic Exercise  Therapeutic Exercise Performed: Yes   Active sign language in conjunction with fluido therapy  Yellow putty pressing and rolling   Yellow putty griping with forearm in various positions   Yellow putty lateral pinch/3 point pinch  Yellow flexbar twist 2 ways   1# free weight yellow putty press   2# free weight forearm sup/pro          Tolerance of session: No difficulty     Outcome Measures:  Quick Dash Score: at eval 75%     OP EDUCATION:  Education  Individual(s) Educated: Patient  Home Program: Strengthening  Risk and Benefits Discussed with Patient/Caregiver/Other: yes  Patient/Caregiver Demonstrated Understanding: yes  Plan of Care Discussed and Agreed Upon: yes  Patient Response to Education: Patient/Caregiver Verbalized Understanding of Information, Patient/Caregiver Performed Return Demonstration of Exercises/Activities, Patient/Caregiver Asked Appropriate Questions    Goals:   By discharge (6 weeks) Chase Guaman  will achieve the following goals:      1. Patient to demonstrate AROM thumb opposition to SF basal crease for dressing and grooming  2. Patient to report pain 0/10 at rest for sleeping  3. Patient to lift and carry 10# with left hand with no difficulty for work tasks  4. Patient to demonstrate  strength left hand to be 70% of dominant side for cooking tasks   5. Patient to demonstrate proper technique and competence with HEP   6. Patient to score disability rate less than 10% on Quick DASH

## 2025-07-31 ENCOUNTER — APPOINTMENT (OUTPATIENT)
Dept: OCCUPATIONAL THERAPY | Facility: CLINIC | Age: 60
End: 2025-07-31
Payer: COMMERCIAL

## 2025-08-04 ENCOUNTER — TREATMENT (OUTPATIENT)
Dept: OCCUPATIONAL THERAPY | Facility: CLINIC | Age: 60
End: 2025-08-04
Payer: COMMERCIAL

## 2025-08-04 DIAGNOSIS — M18.12 UNILATERAL PRIMARY OSTEOARTHRITIS OF FIRST CARPOMETACARPAL JOINT, LEFT HAND: Primary | ICD-10-CM

## 2025-08-04 PROCEDURE — 97110 THERAPEUTIC EXERCISES: CPT | Mod: GO | Performed by: OCCUPATIONAL THERAPIST

## 2025-08-04 ASSESSMENT — PAIN SCALES - GENERAL: PAINLEVEL_OUTOF10: 0 - NO PAIN

## 2025-08-04 ASSESSMENT — PAIN - FUNCTIONAL ASSESSMENT: PAIN_FUNCTIONAL_ASSESSMENT: 0-10

## 2025-08-04 NOTE — PROGRESS NOTES
"Occupational Therapy     Occupational Therapy Treatment  Name: Chase Guaman   MRN: [id number]   : 1965   Date:  2025    Time Calculation  Start Time: 845  Stop Time: 923  Time Calculation (min): 38 min      OT Therapeutic Procedures Time Entry  Therapeutic Exercise Time Entry: 38      Current Problem:   Left CMC OA   Surgical Procedure: Left thumb CMC arthroplasty with end to side tendon transfer of abductor pollicis longus to flexor carpi radialis. Partial resection trapezoid for treatment of painful left wrist STT arthritis.   DOS: 2025    Visit Number: 6    Insurance Authorization Request: [employer] BM[employer] PT OT COPAY 30 DED (MET)  COVERAGE 80 OOP [id number](0) 00698(0) NO AUTH REQ     Assessment:  Patient demonstrates functional thumb motion and reports return to daily functional activities with minimal to no difficulty. Progressed resistance training to red putty today and reviewed HEP for patient to continue strengthening at home. Patient verbalized and demonstrated understanding. All questions were answered. Discharge patient to HEP today.     Plan:  Outpatient Plan  OT Plan: Discharge patient to HEP  Frequency: no further therapy session scheduled  Rehab Potential: Good  Plan of Care Agreement: Patient      Subjective   General: \"I am very close to 100%. I am ready to go back to work and be done with therapy.\"    Precautions: WBAT    Pain Assessment:  Location: left thumb   0/10 at rest, 0/10 at highest  Description: soreness      HEP Adherence/Understanding: Good     Objective  UE Assessment  Observation: Mod edema presented in thenar eminence. No sign of infection.     Palpation: N/A     Range of Motion (in degrees)  Shoulder AROM: WNL     PROM: WNL     Elbow AROM: WNL     PROM: WNL     Wrist AROM: Flex 60, ext 40 (was 35), sup and pro WFL     PROM: TBT     Digits AROM: tight composite fist   PROM: TBT     Thumb AROM: MCP 0/30, IP 0/60, opposition to SF basal crease      PROM: TBT    "   Sensation: Numbness and tingling in left thumb comes and goes.      Strength:    strength: R 100#, L 75#   Lateral pinch strength: R 20#,  L 15#  3 point pinch strength: R 18#,  L 12#      Treatment Interventions:   Therapeutic Exercise  Therapeutic Exercise Performed: Yes   Active sign language in conjunction with fluido therapy  Red putty griping with forearm in various positions   Red putty lateral pinch/3 point pinch  Red flexbar twist 2 ways   Red putty rolling and pizza cutter cutting           Tolerance of session: No difficulty     Outcome Measures:  Quick Dash Score: at eval 75%, at discharge 6.82%     OP EDUCATION:  Education  Individual(s) Educated: Patient  Home Program: Strengthening  Risk and Benefits Discussed with Patient/Caregiver/Other: yes  Patient/Caregiver Demonstrated Understanding: yes  Plan of Care Discussed and Agreed Upon: yes  Patient Response to Education: Patient/Caregiver Verbalized Understanding of Information, Patient/Caregiver Performed Return Demonstration of Exercises/Activities, Patient/Caregiver Asked Appropriate Questions    Goals:   By discharge (6 weeks) Chase Guaman  will achieve the following goals:     1. Patient to demonstrate AROM thumb opposition to SF basal crease for dressing and grooming (met)  2. Patient to report pain 0/10 at rest for sleeping (met)  3. Patient to lift and carry 10# with left hand with no difficulty for work tasks (met)  4. Patient to demonstrate  strength left hand to be 70% of dominant side for cooking tasks (met)  5. Patient to demonstrate proper technique and competence with HEP (met)  6. Patient to score disability rate less than 10% on Quick DASH (met)

## 2025-08-11 ENCOUNTER — APPOINTMENT (OUTPATIENT)
Dept: OCCUPATIONAL THERAPY | Facility: CLINIC | Age: 60
End: 2025-08-11
Payer: COMMERCIAL

## 2025-08-18 ENCOUNTER — APPOINTMENT (OUTPATIENT)
Dept: OCCUPATIONAL THERAPY | Facility: CLINIC | Age: 60
End: 2025-08-18
Payer: COMMERCIAL

## 2025-08-21 ENCOUNTER — OFFICE VISIT (OUTPATIENT)
Dept: ORTHOPEDIC SURGERY | Facility: CLINIC | Age: 60
End: 2025-08-21
Payer: COMMERCIAL

## 2025-08-21 ENCOUNTER — HOSPITAL ENCOUNTER (OUTPATIENT)
Dept: RADIOLOGY | Facility: CLINIC | Age: 60
Discharge: HOME | End: 2025-08-21
Payer: COMMERCIAL

## 2025-08-21 DIAGNOSIS — M19.049 CMC ARTHRITIS: ICD-10-CM

## 2025-08-21 DIAGNOSIS — M19.049 CMC ARTHRITIS: Primary | ICD-10-CM

## 2025-08-21 PROCEDURE — 73140 X-RAY EXAM OF FINGER(S): CPT | Mod: LT

## 2025-08-21 PROCEDURE — 99202 OFFICE O/P NEW SF 15 MIN: CPT | Performed by: ORTHOPAEDIC SURGERY

## (undated) DEVICE — BANDAGE, ELASTIC, PREMIUM, SELF-CLOSE, 4 IN X 5.5 YD, STERILE

## (undated) DEVICE — CAUTERY, PENCIL, PUSH BUTTON, SMOKE EVAC, 70MM

## (undated) DEVICE — CUFF, TOURNIQUET, 18 X 4, DUAL PORT/SNGL BLADDER, DISP, LF

## (undated) DEVICE — CUFF, TOURNIQUET, 18 X 5.5, DISP, LF

## (undated) DEVICE — DRESSING, GAUZE, SUPER KERLIX, 6X6

## (undated) DEVICE — SLING, ARM, W/LEATHERETTE PAD, LARGE

## (undated) DEVICE — GLOVE, SURGICAL, PROTEXIS PI MICRO, 7.0, PF, LF

## (undated) DEVICE — SUTURE, CTD, VICRYL, 2-0, UND, BR, CT-2

## (undated) DEVICE — PADDING, UNDERCAST, WEBRIL, 4 IN X 4 YD, REG, NS

## (undated) DEVICE — Device

## (undated) DEVICE — DRAPE, SHEET, 17 X 23 IN

## (undated) DEVICE — APPLICATOR, CHLORAPREP, W/ORANGE TINT, 26ML

## (undated) DEVICE — SUTURE, VICRYL, 0, 27 IN, CT-2, UNDYED

## (undated) DEVICE — SUTURE, ETHILON, 4-0, BLK, MONO, PS-2 18

## (undated) DEVICE — GLOVE, SURGICAL, PROTEXIS PI MICRO, 7.5, PF, LF

## (undated) DEVICE — GLOVE, SURGICAL, PROTEXIS PI , 7.5, PF, LF

## (undated) DEVICE — GLOVE, SURGICAL, PROTEXIS PI , 7.0, PF, LF

## (undated) DEVICE — SOLUTION, IRRIGATION, SODIUM CHLORIDE 0.9%, 1000 ML, POUR BOTTLE

## (undated) DEVICE — COVER, EQUIPMENT, C ARM, W/ STRAPS

## (undated) DEVICE — DRESSING, NON-ADHERENT, 3 X 3 IN, STERILE

## (undated) DEVICE — SOLUTION, IRRIGATION, STERILE WATER, 1000 ML, POUR BOTTLE

## (undated) DEVICE — SUTURE, ETHIBOND XTRA, 2-0, 30 IN, RB-1, LF

## (undated) DEVICE — TOWEL PACK, STERILE, 4/PACK, BLUE